# Patient Record
Sex: MALE | Race: WHITE | NOT HISPANIC OR LATINO | Employment: FULL TIME | URBAN - METROPOLITAN AREA
[De-identification: names, ages, dates, MRNs, and addresses within clinical notes are randomized per-mention and may not be internally consistent; named-entity substitution may affect disease eponyms.]

---

## 2017-01-30 ENCOUNTER — OFFICE VISIT (OUTPATIENT)
Dept: SURGERY | Facility: CLINIC | Age: 42
End: 2017-01-30
Payer: COMMERCIAL

## 2017-01-30 VITALS — WEIGHT: 193.81 LBS | SYSTOLIC BLOOD PRESSURE: 138 MMHG | DIASTOLIC BLOOD PRESSURE: 72 MMHG | HEART RATE: 72 BPM

## 2017-01-30 DIAGNOSIS — C20 RECTAL CANCER: Primary | ICD-10-CM

## 2017-01-30 PROCEDURE — 99205 OFFICE O/P NEW HI 60 MIN: CPT | Mod: S$GLB,,, | Performed by: COLON & RECTAL SURGERY

## 2017-01-30 PROCEDURE — 1159F MED LIST DOCD IN RCRD: CPT | Mod: S$GLB,,, | Performed by: COLON & RECTAL SURGERY

## 2017-01-30 PROCEDURE — 99999 PR PBB SHADOW E&M-NEW PATIENT-LVL II: CPT | Mod: PBBFAC,,, | Performed by: COLON & RECTAL SURGERY

## 2017-01-30 NOTE — LETTER
January 31, 2017      Brett Jain MD  [address] [facility] [address]           [facility]  [address]  [facility] [address]  Phone: [phone]          Patient: Vinod Loja   MR Number: [id number]   YOB: 1975   Date of Visit: 1/30/2017       Dear Dr. Brett Jain:    Thank you for referring Vinod Loja to me for evaluation. Attached you will find relevant portions of my assessment and plan of care.    If you have questions, please do not hesitate to call me. I look forward to following Vinod Loja along with you.    Sincerely,    Issa Armijo MD    Enclosure  CC:  No Recipients    If you would like to receive this communication electronically, please contact [email or web address] or [phone] to request more information on [facility] [employer] access.    For providers and/or their staff who would like to refer a patient to [facility], please contact us through our one-stop-shop provider referral line, [facility], at [phone].    If you feel you have received this communication in error or would no longer like to receive these types of communications, please e-mail [email or web address]

## 2017-01-31 ENCOUNTER — PATIENT MESSAGE (OUTPATIENT)
Dept: SURGERY | Facility: CLINIC | Age: 42
End: 2017-01-31

## 2017-01-31 NOTE — PROGRESS NOTES
CRS History & Physical    SUBJECTIVE:     Chief Complaint: Rectal Cancer    History of Present Illness:  Patient is a 41 y.o. otherwise healthy  male presents with biopsy proven invasive rectal adenocarcinoma detected on diagnostic colonoscopy performed for 6 month history of intermittent hematochezia. On colonoscopy he was found to have a 3.2cm rectal mass at 10cm. CT abd/pel was negative for metastatic disease.     Associated symptoms include unintentional weight loss of 5 lbs and diarrhea. Pertinent negatives include no fever, no nausea, no rectal pain, no vomiting, no hematuria, no chest pain, no headaches, no coughing and no rash.    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    Past Surgical History:  Hand surgery    Social history:   He reports that he drinks alcohol. He reports that he has never smoked. He does not have any smokeless tobacco history on file.    Family History:   Denies fhx of Heart disease, Stroke, Prostate cancer, and Colon cancer.    Meds   cetirizine (ZYRTEC) 10 MG tablet Take 10 mg by mouth daily.    finasteride (PROPECIA) 1 mg tablet TAKE 1 TABLET BY MOUTH DAILY. 30 tablet 5    fluticasone (FLONASE) 50 mcg/actuation nasal spray 1 spray by Nasal route daily.    montelukast (SINGULAIR) 10 mg tablet Take 1 tablet by mouth nightly. 30 tablet 2     Review of Systems:  Review of Systems      Constitutional: No fever, chills, or change in activity or appetite.      HENT: No hearing loss, swelling, neck pain or stiffness.       Eyes: No  Itching, discharge, and visual disturbance.      Respiratory: No cough, choking or shortness of breath.       Cardiovascular: No leg swelling or chest pain      Gastrointestinal: No abdominal distention or change in bowel habbits     Genitourinary: No dysuria, frequency or flank pain.      Musculoskeletal: No trouble walking or  arthralgias.      Neurological: No weakness, dizziness, or seizures .      Hematological: No adenopathy.      Psychiatric/Behavioral: No hallucinations or changes in behavior.  Remainder ROS  Negative except per HPI      OBJECTIVE:     Vital Signs (Most Recent)  Pulse: 72 (01/30/17 1504)  BP: 138/72 (01/30/17 1504)    Physical Exam:  General: White male in NAD sitting in chair in clinic  Neuro: aaox4 maex4 perrl  Respiratory: resps even unlabored  Cardiac: cap refill <2 sec  Abdomen: Normal, benign.  Extremities: Warm dry and intact  Anorectal: deferred    Diagnostic Results:  Pathology from Colonoscopy:                                           FINAL PATHOLOGIC DIAGNOSIS        1. Transverse colon, polyp, biopsy:                                         - Tubular adenoma.                                                      - Negative for high grade dysplasia or malignancy.                    2. Rectal mass, biopsy:                                                     - Positive for invasive adenocarcinoma.            EXAM: CT ABDOMEN AND PELVIS, W AND WO CONTRAST* 1/24/17    CLINICAL HISTORY: Disease of the intestine, unspecified left-sided abdominal  pain and weight loss    COMPARISON: None available.    TECHNIQUE: Initially, axial CT imaging was performed through the abdomen  without intravenous contrast.  Subsequently, axial CT imaging was performed  through the abdomen and pelvis with intravenous contrast.  Multiplanar  reformats were performed and interpreted.  75 mL of Isovue-370 was administered  intravenously.    FINDINGS:    Lung bases are clear.    The liver is normal in size and contour without focal mass.  No hydronephrosis.  No solid renal mass identified.  Benign cortical cyst at  the upper and interpolar region of the left kidney measuring up to 1.4 cm.  The spleen, adrenal glands, and pancreas are within normal limits.  The gallbladder is unremarkable.    No free intraperitoneal fluid or air.    The  small bowel is within normal limits.  The appendix is normal.    There is a irregular plaque like mass along the right lateral wall of the  rectum that measures approximately 3.2 cm in craniocaudal dimension located  approximately 10 cm from the anal verge in the upper third of the rectum.  There are small adjacent perirectal lymph nodes all of which measure less than  5 mm.  No pericolonic fat stranding.  No evidence of perforation or abscess.  No bowel obstruction.No abdominal lymphadenopathy.      Aorta caliber is normal.    The urinary bladder is within normal limits.  The prostate is unremarkable.  No  free pelvic fluid.    No suspicious osseous lesions.      ASSESSMENT/PLAN:     41 M with rectal cancer. We had an extensive conversation regarding surgical options, the possibility of the need for neoadjuvant chemoradiotherapy and the possibility of entering a clinical trial using crispin adj chemotherapy without XRT based on his MRI results.    We counseled the patients on the risks of the procedure including bleeding with the possible need for transfusion, possible poor functional outcomes, sexual dysfunction (including retrograde ejaculation and impotence), infection, leak, abscess, damage to intra-abdominal structures requiring additional procedures.     The patient's questions were answered to his satisfaction and the patient has decided to deliberate on how to proceed.     -Recommend MRI pelvis for appropriate pre-operative staging, CT chest and CEA.  -Pt will call our clinic regarding whether he desires to proceed with treatment at [facility].    Zander Reese MD  Colorectal Surgery Fellow  [phone]      I have personally taken the history and examined this patient and agree with the resident's note as stated above.    Time of visit = 1 hr, >50% counseling.